# Patient Record
Sex: FEMALE | Race: WHITE | NOT HISPANIC OR LATINO | Employment: FULL TIME | URBAN - METROPOLITAN AREA
[De-identification: names, ages, dates, MRNs, and addresses within clinical notes are randomized per-mention and may not be internally consistent; named-entity substitution may affect disease eponyms.]

---

## 2024-06-19 RX ORDER — PHENTERMINE AND TOPIRAMATE 7.5; 46 MG/1; MG/1
CAPSULE, EXTENDED RELEASE ORAL DAILY
COMMUNITY
Start: 2024-05-24

## 2024-07-24 ENCOUNTER — APPOINTMENT (OUTPATIENT)
Dept: GERIATRIC MEDICINE | Facility: CLINIC | Age: 44
End: 2024-07-24

## 2024-07-24 ENCOUNTER — OFFICE VISIT (OUTPATIENT)
Dept: NEUROLOGY | Facility: CLINIC | Age: 44
End: 2024-07-24

## 2024-07-24 ENCOUNTER — LAB (OUTPATIENT)
Dept: LAB | Facility: LAB | Age: 44
End: 2024-07-24

## 2024-07-24 ENCOUNTER — APPOINTMENT (OUTPATIENT)
Dept: GENETICS | Facility: CLINIC | Age: 44
End: 2024-07-24

## 2024-07-24 VITALS
HEIGHT: 62 IN | SYSTOLIC BLOOD PRESSURE: 109 MMHG | WEIGHT: 188 LBS | TEMPERATURE: 98.1 F | HEART RATE: 74 BPM | BODY MASS INDEX: 34.6 KG/M2 | RESPIRATION RATE: 18 BRPM | DIASTOLIC BLOOD PRESSURE: 70 MMHG

## 2024-07-24 VITALS — TEMPERATURE: 98.4 F | HEART RATE: 84 BPM | SYSTOLIC BLOOD PRESSURE: 118 MMHG | DIASTOLIC BLOOD PRESSURE: 78 MMHG

## 2024-07-24 DIAGNOSIS — Z82.0 FAMILY HISTORY OF NEUROLOGICAL DISEASE: Primary | ICD-10-CM

## 2024-07-24 DIAGNOSIS — F41.8 DEPRESSION WITH ANXIETY: Primary | ICD-10-CM

## 2024-07-24 PROCEDURE — 99404 PREV MED CNSL INDIV APPRX 60: CPT | Performed by: MEDICAL GENETICS

## 2024-07-24 PROCEDURE — 99214 OFFICE O/P EST MOD 30 MIN: CPT | Performed by: PSYCHIATRY & NEUROLOGY

## 2024-07-24 PROCEDURE — 3008F BODY MASS INDEX DOCD: CPT | Performed by: MEDICAL GENETICS

## 2024-07-24 PROCEDURE — 1036F TOBACCO NON-USER: CPT | Performed by: PSYCHIATRY & NEUROLOGY

## 2024-07-24 RX ORDER — HYDROXYZINE HYDROCHLORIDE 25 MG/1
50 TABLET, FILM COATED ORAL 3 TIMES DAILY PRN
COMMUNITY
Start: 2024-01-24

## 2024-07-24 RX ORDER — ALUMINUM CHLORIDE 20 %
SOLUTION, NON-ORAL TOPICAL
COMMUNITY
Start: 2023-12-01

## 2024-07-24 RX ORDER — FLUOXETINE 10 MG/1
10 CAPSULE ORAL
COMMUNITY
Start: 2023-12-06

## 2024-07-24 ASSESSMENT — MONTREAL COGNITIVE ASSESSMENT (MOCA)
12. MEMORY INDEX SCORE: 3
WHAT LEVEL OF EDUCATION WAS ATTAINED: 1
10. [FLUENCY] NAME WORDS STARTING WITH DESIGNATED LETTER: 1
13. ORIENTATION SUBSCORE: 6
8. SERIAL SUBTRACTION OF 7S: 3
7. [VIGILENCE] TAP WHEN HEARING DESIGNATED LETTER: 1
9. REPEAT EACH SENTENCE: 2
WHAT IS THE TOTAL SCORE (OUT OF 30): 27
11. FOR EACH PAIR OF WORDS, WHAT CATEGORY DO THEY BELONG TO (OUT OF 2): 2
4. NAME EACH OF THE THREE ANIMALS SHOWN: 3
5. MEMORY TRIALS: 0
6. READ LIST OF DIGITS [FORWARD/BACKWARD]: 2
VISUOSPATIAL/EXECUTIVE SUBSCORE: 3

## 2024-07-24 ASSESSMENT — PATIENT HEALTH QUESTIONNAIRE - PHQ9
1. LITTLE INTEREST OR PLEASURE IN DOING THINGS: NOT AT ALL
2. FEELING DOWN, DEPRESSED OR HOPELESS: NOT AT ALL
SUM OF ALL RESPONSES TO PHQ9 QUESTIONS 1 & 2: 0

## 2024-07-24 ASSESSMENT — PAIN SCALES - GENERAL: PAINLEVEL: 0-NO PAIN

## 2024-07-24 ASSESSMENT — ENCOUNTER SYMPTOMS
OCCASIONAL FEELINGS OF UNSTEADINESS: 0
LOSS OF SENSATION IN FEET: 0
OCCASIONAL FEELINGS OF UNSTEADINESS: 0
DEPRESSION: 0
DEPRESSION: 0

## 2024-07-24 NOTE — LETTER
July 25, 2024     [facility]    Patient: Jena Louis   YOB: 1980   Date of Visit: 7/24/2024       Dear Dr. LARES - to file:    Thank you for referring Jena Louis to me for evaluation. Below are my notes for this consultation.  If you have questions, please do not hesitate to call me. I look forward to following your patient along with you.       Sincerely,     Roger Ortiz MD      CC: No Recipients  ______________________________________________________________________________________    Start Time: 3:55pm  Stop Time: 5:15pm    Formulation: Ms. Louis is a very-pleasant 43 y.o. year old, right handed, ,  female with 12 years of formal education, a family history of genetic Creutzfeldt-Yonis disease (E200K), and past personal history of depression and anxiety who is presenting today for a cognitive evaluation.  She still struggles with depression and anxiety and may benefit from seeing a psychiatrist to address possible medication changes as well as seeing a therapist.  She may also benefit from having a polysomnogram to evaluate for possible obstructive sleep apnea.  We discussed pros, cons, and alternatives of testing.    Diagnosis:  Depression with anxiety, Not otherwise specified, mild severity  Insomnia, Not otherwise specified (rule-out obstructive sleep apnea)    Plan:  - consider life insurance and long-term care insurance  - discuss and plan what you will and won't tell dad and your sister  - consider following-up with a psychiatrist and a therapist  - discuss insomnia and possible polysomnogram with PCP  - continue current medications  - I will wait for your call to potentially discuss results    --------------------------------------------------  History of Present Illness:    Referred by: self    Accompanied by:     Insight into changes: No    Onset and progression:  She denies any cognitive, functional, motor, or emotional changes.    Cognitive  symptoms:  Memory: No  Executive functioning: No  Language: No  Attention/Concentration: No  Social Cognition:    Personality changes: No   Socially inappropriate behaviors: No   Apathy: No   Loss of empathy: No   Change in eating habits: No   Perseverative behaviors: No    Functional changes:  IADLS: No  ADLS: No    Motor changes: No    Neuropsychiatric symptoms:    Mood:  variable   Sleep: problems staying asleep, light snoring noted, short latency to sleep, sometimes in dangerous settings (e.g., driving, back of motorcycle)   REM sleep behavior disorder symptoms: No   Anhedonia: No   Self-attitude: diminished   Appetite: good   PDW/SI: No   Hallucinations: No   Delusions: No    Prior Work-up: N/A    Past Neuropsychiatric History:  Handedness: right handed  History of traumatic brain injury: No  History of seizures: No  History of stroke: No  Past psychiatric history: Yes  Past diagnoses: 19 y.o.a. for depression/anxiety  Past psychiatric admissions: Yes, admitted for a couple of weeks at age 19 for suicidal ideation   Past psychiatric treatment: Yes, lexapro, lorazepam, fluoxetine, celexa  Psychiatrist/Therapist: none currently    Family History   Problem Relation Name Age of Onset   • Other (Genetic Creutzfeldt-Yonis disease (E200K)) Paternal Grandfather     • Other (Genetic Creutzfeldt-Yonis Disease (E200K)) Other Paternal family members        Social History     Tobacco Use   • Smoking status: Never   • Smokeless tobacco: Never   Substance Use Topics   • Alcohol use: Yes     Comment: twice per year     [employer]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     Past Medical History:   Diagnosis Date   • Obesity (BMI 30.0-34.9)    • Osteoarthritis        Past Surgical History:   Procedure Laterality Date   • TOTAL ABDOMINAL HYSTERECTOMY  2013       Allergies   Allergen Reactions   • Augmentin [Amoxicillin-Pot Clavulanate] Unknown         Current Outpatient Medications:   •  aluminum chloride (Drysol Dab-O-Matic) 20 % external  "solution, Apply topically once daily., Disp: , Rfl:   •  FLUoxetine (PROzac) 10 mg capsule, Take 1 capsule (10 mg) by mouth once daily., Disp: , Rfl:   •  hydrOXYzine HCL (Atarax) 25 mg tablet, Take 2 tablets (50 mg) by mouth 3 times a day as needed., Disp: , Rfl:   •  Qsymia 7.5-46 mg capsule, ER multiphase 24 hr, Take by mouth once daily., Disp: , Rfl:     Review of Systems  As per HPI, otherwise all other systems have been reviewed are negative for complaint.      Objective  /78   Pulse 84   Temp 36.9 °C (98.4 °F) (Tympanic)     EXAMINATION  Mental Status Examination  General appearance: well kept, good eye contact, cooperative  Orientation: alert and oriented to time, place, and person  Motor: unremarkable, gait is stable  Speech: normal rate, tone and rhythm  Mood: anxious  Affect: Euthymic, full-range and Congruent with mood and topic of conversation  Passive death wish: No  Suicidal ideation: No  Thought process: logical, linear, and goal-directed  Thought content: Hallucinations:no, Delusions: none, denied; and not responding to internal stimuli  Insight/Judgment: Good/Good  Fund of knowledge: Good  Recent and remote memory: Good/Good  Attention span and concentration: Good  Language: regular, rate, rhythm, tone, and volume and without paraphrasic errors    MoCA (7/24/2024) : 27/30 (missing -2 visuospatial/executive, -2 delayed, +1 edu )  \"f\"= [13], \"animals\"= [15]  Memory Index Score (MIS): 13/15  Agraphia: No  Alexia: No  Head turning sign: negative    NEUROLOGICAL EXAMINATION  CN II-XII grossly intact    Motor:  Muscle bulk was normal  normal throughout upper and lower extremities  Finger taps and hand opening: normal  Toe and heel taps: normal  Muscular tone: normal  Movements: normal    Reflexes:  2+ bilaterally           Sandro's reflex: abnormal on right side, negative Babinski's sign  Frontal release signs: none    Coordination: Finger-nose-finger testing is normal and without tremor or " dysmetria. and Heel-knee-shin testing is normal.    Gait:  Able to stand from seated position with arms across chest: normal  stable    Sensory  Romberg's sign: negative

## 2024-07-24 NOTE — PROGRESS NOTES
"HITECH encounter    Jena Louis is a 43-year-old female here today for genetic counseling for predictive genetic testing, in the setting of a family history of familial Creutzfeldt-Yonis disease (fCJD) in a grandparent.  She was accompanied by her  today.  She was referred by and will see Dr. Roger Ortiz later today for further evaluation.    PMHx:  Hysterectomy in 2013 for excessive breakthrough bleeding, but still has ovaries.  Some depression and anxiety, obesity, minor arthritis.    Family History:      Paternal grandfather had CJD and passed away at age 44.       Two of father's sisters had CJD or tested positive for the mutation.   A paternal aunt passed away in her early 70s after <1 year of symptoms.   Another paternal aunt, age 65, tested positive, but has had a very slow progression of neurological symptoms; it is uncertain whether these actually represent CJD at this time or whether she could even be presymptomatic.    A paternal cousin, early 50s, tested positive and is asymptomatic.    Other relatives of grandfather, including those in the preceding generation, are suspected to have had CJD.    Mrs. Louis's father has not been tested. He was in an MVA around age 36 and experienced TBI and other serious injuries, now age 70.  He has had some \"mental decline\" in the past approximately 2 years but no symptoms specific for CJD.  He also had an MI in 2013 and a stroke.    Family history is also notable for mother,  at age 44, who had a history of both ovarian/cervical/uterine cancer with an unknown primary, and pancreatic cancer.  Paternal grandmother also  of pancreatic cancer in her mid 80s.  Maternal grandmother was  at age 51 of cancer that may have been breast cancer.    Mrs. Louis states she has known about this genetic condition being in her family \"my whole life\" and has not donated blood due to this concern.  Her adult children are now starting families.   She notes that " she feels like it is time to know her status for the sake of her children.      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  , 2 adult sons, one grandchild on the way.  Lives in [place].      Discussion:     It was a pleasure to meet you today.  We discussed familial Creutzfeldt-Yonis disease, or fCJD.  (Genetic forms are responsible for about 10-15% of all CJD.)    The symptoms of cognitive decline, or dementia, followed by unsteady gait and incoordination, or ataxia, are characteristic of individuals with fCJD.  Additional features that may be seen in some individuals or families with fCJD include myoclonus, or muscle twitching, and less commonly, other manifestations such as psychiatric disorders, seizures, spasticity, Parkinson disease-like features (including tremor), sleep disturbance, limitations in eye movement (supranuclear gaze palsy), and peripheral neuropathy (nerve damage).  As you are aware, the disease often has a fairly rapid course (measured in months) from diagnosis to a person's death.    Although not discussed in detail, the PRNP gene contains the instructions for the assembly of the prion protein.  The prion protein, which is produced in the brain, has an unclear role in the human body.  Normally the prion protein has a particular shape, specifically an alpha helix.  This normal prion protein can be degraded by enzymes called proteases.  Individuals with a genetic change in the PRNP gene produce abnormal prion protein.  This abnormal prion protein is relatively resistant to being degraded by proteases.  It is believed that in patients with a pathologic change in the PRNP gene, the abnormal prion protein, PrPSc, accumulates in cells in the brain and is toxic to them.    The genetic change called E200K that you report in your family members (per Dr. Ortiz's conversation with you) results from a single nucleotide change,  "from a “G” to an “A” in the genetic code, specifically at the location of nucleotide #598 (c.598G>A).  This causes the product of the gene to contain a different amino acid at position #200, specifically lysine (K) instead of glutamic acid (E).      Your test report will tell us whether or not you inherited this, and will also include information about the \"#129\" amino acid position of the gene.  For each copy of the gene, people either have an M or a V at that position.  “129M” describes the presence of methionine rather than valine at amino acid #129 in the protein.  129M is not  disease-causing in itself, but is a variant which can in some cases modify the effects of having an E200K or other genetic change in the PRNP gene.  There is limited information about the impact of 129M on individuals with the E200K genetic change, but some sources suggest that there may be an earlier onset and quicker progression of disease if 129M is found in both of an individual's two copies of the gene.   (Some newer sources, that I accessed after the visit, suggest the impact of 129M may be minimal in persons with E200K.  See Parul WEINER et al. Age at onset in genetic prion disease and the design of preventive clinical trials. Neurology. 2019 Jul 9;93(2):l949-e589. Epub 2019 Jun 6. PMID: 57332298; PMCID: [id number].)    Familial CJD is inherited in an autosomal dominant manner.  This means that individuals with fCJD have a genetic change in one of their two copies of the PRNP gene, and that the children of individuals with this genetic change have a 50% chance of inheriting the change.    There are many genetic changes that can be associated with fCJD, of which the E200K is one of the most common.  The age of onset in patients who have fCJD resulting from an E200K genetic change varies widely, with a mean age of about 60 years reported in the literature.  However, the age of onset of [id number] fCJD may vary widely, with " "individuals reported with symptoms between ages 31-92.  Individuals with the genetic change are increasingly likely to develop symptoms as they age.    The E200K genetic change is also notable for having incomplete penetrance. That means that some individuals who have the genetic change will not develop fCJD, although most will develop the condition if they live to the expected age of onset.  Specific estimates of the percentage of individuals with the genetic change who will develop symptoms of fCJD vary widely in the medical literature. A recent estimate is >90%.    It is important to recognize that a phenomenon called \"ascertainment bias\" sometimes makes estimates of penetrance (i.e., the chance that a person with a mutation will develop symptoms) appear higher than they actually are. Individuals who do not develop symptoms are less likely to have genetic testing, so may have an unrecognized mutation, and not be counted in the statistics of how many people with the mutation did and did not develop symptoms.    See: Winter ABDUL, Aguila SAINZ. Genetic counseling for prion disease: Updates and best practices. [facility]. 2022 Oct;24(10):4972-9663. doi: 10.1016/j.gim.2022.06.003. Epub 2022 Jul 12. PMID: 94005539.    We discussed TANK, which stands for the Genetic Information Nondiscrimination Act and is legislation that prevents health insurance companies from using an individual's genetic information to determine eligibility or establish premiums. It also provides protection against employment discrimination based on genetic information with the exception of the [place] , the Federal Employees Health Benefits program, and employers with fewer than 15 employees. However, this protection does not apply to life insurance, disability insurance, or long-term care insurance, meaning these companies can ask for genetic information when an individual takes out a new/additional policy in one of those areas. As such, for " symptom-free individuals it could be beneficial to explore/take out policies in these areas PRIOR to undergoing genetic testing.   We discussed that individuals are obligated to report what they know if asked directly regardless of whether medical records are being shared.    Overall, I consider you at 25% risk of inheriting familial CJD, because you had an affected grandfather and paternal aunts, but your father's status is unknown.  His traumatic brain injury makes it more difficult to interpret why he is having cognitive decline.  Some individuals with familial CJD do not develop symptoms until their 80s or 90s, so his gene status cannot be determined by age alone.    If you were to test positive, you understand that this would mean that your father almost certainly carries the gene change.  (You are not aware of any CJD on your maternal side of the family.)  You are not planning to share your results with him.  If you test negative, it would not really tell us anything about your father's gene status.  We also discussed that if your result is positive, your sister and each of your sons would have a 50% chance of sharing the gene change.  When a parent has the gene change, each child has a 50/50 chance of inheriting it, and inheritance of a working or non-working copy of the gene by each child is an independent event.    It is also possible for a person of child-bearing age with positive gene status (in either the male or female partner) to use reproductive technology, called preimplantation genetic diagnosis, to start a pregnancy using an embryo that is known not to have the gene change.  (This is sometimes possible for at-risk parents to do without actually knowing their own gene status, but this is much more complicated.  If one of your sons is interested in this, they and their partner should discuss this with a genetic counselor.)    I remain hopeful that at some point in the future, researchers will develop  effective prevention and/or treatment of familial CJD, perhaps in the form of gene therapy to modify the non-working gene.      Plan:    1) At the conclusion of the visit, you elected to be tested and wish to consider making some additional planning arrangements prior to learning your results.  We syed your blood today (4 tubes) and are sending it to the [facility] for this testing. Testing is expected to take approximately 1 month. Dr. Ortiz will return your results to you, and you will discuss the logistics and timing of that with him.   Results will likely not be released directly into [employer].  However, if you do get a [employer] notification, I recommend that you call Dr. Ortiz before looking at the result.  The test will look at the entire gene, although you are unlikely to have a different gene change from the one that relatives have.  (If for some reason your relatives had a mutation other than E200K, this would still detect it if present in you.)     2) I would encourage you to consider seeing a cancer genetic counselor in [place] (these services are widely available), to discuss your family history of cancer.  It is possible that identification of a cancer risk gene could impact your own health care decision making, and also could be informative for your children and future grandchildren.    If you have any questions, please call Maru Jimenez in the [facility] at [phone] option 1 or at her direct number [phone] or send me a non-urgent message through [employer].    Cathy Barrera MD  Clinical      Visit time: 12:37-1:47

## 2024-07-24 NOTE — PATIENT INSTRUCTIONS
- consider life insurance and long-term care insurance  - discuss and plan what you will and won't tell dad and your sister  - consider following-up with a psychiatrist and a therapist  - continue current medications  - I will wait for your call to potentially discuss results    General brain health guidelines:  - make sure your other medical conditions are well controlled (e.g., high blood pressure, high cholesterol, diabetes, sleep apnea etc)  - do not smoke or chew tobacco  - address any sensory deficits (e.g., proper glasses for poor eyesight, hearing aids for hearing loss)  - use a weekly pill box  - eat a heart healthy diet (e.g., lots of fruits and vegetables, low fat and cholesterol)  - exercise at least four days per week, 30 minutes at a time at an intensity that would make it difficult to converse with someone   - make sure you are getting at least 7 hours of quality sleep per night  - keep yourself mentally active daily by reading, playing cards, doing word searches, puzzles, etc.  - stay socially active by being part of a group or organization    Please note that the above may not be an entirely accurate or complete list of your medications, allergies, past medical history, past surgical history, or active problems as the document is completed following your visit.

## 2024-07-24 NOTE — PROGRESS NOTES
Start Time: 3:55pm  Stop Time: 5:15pm    Formulation: Ms. Louis is a very-pleasant 43 y.o. year old, right handed, ,  female with 12 years of formal education, a family history of genetic Creutzfeldt-Yonis disease (E200K), and past personal history of depression and anxiety who is presenting today for a cognitive evaluation.  She still struggles with depression and anxiety and may benefit from seeing a psychiatrist to address possible medication changes as well as seeing a therapist.  She may also benefit from having a polysomnogram to evaluate for possible obstructive sleep apnea.  We discussed pros, cons, and alternatives of testing.    Diagnosis:  Depression with anxiety, Not otherwise specified, mild severity  Insomnia, Not otherwise specified (rule-out obstructive sleep apnea)    Plan:  - consider life insurance and long-term care insurance  - discuss and plan what you will and won't tell dad and your sister  - consider following-up with a psychiatrist and a therapist  - discuss insomnia and possible polysomnogram with PCP  - continue current medications  - I will wait for your call to potentially discuss results    --------------------------------------------------  History of Present Illness:    Referred by: self    Accompanied by:     Insight into changes: No    Onset and progression:  She denies any cognitive, functional, motor, or emotional changes.    Cognitive symptoms:  Memory: No  Executive functioning: No  Language: No  Attention/Concentration: No  Social Cognition:    Personality changes: No   Socially inappropriate behaviors: No   Apathy: No   Loss of empathy: No   Change in eating habits: No   Perseverative behaviors: No    Functional changes:  IADLS: No  ADLS: No    Motor changes: No    Neuropsychiatric symptoms:    Mood:  variable   Sleep: problems staying asleep, light snoring noted, short latency to sleep, sometimes in dangerous settings (e.g., driving, back of  motorcycle)   REM sleep behavior disorder symptoms: No   Anhedonia: No   Self-attitude: diminished   Appetite: good   PDW/SI: No   Hallucinations: No   Delusions: No    Prior Work-up: N/A    Past Neuropsychiatric History:  Handedness: right handed  History of traumatic brain injury: No  History of seizures: No  History of stroke: No  Past psychiatric history: Yes  Past diagnoses: 19 y.o.a. for depression/anxiety  Past psychiatric admissions: Yes, admitted for a couple of weeks at age 19 for suicidal ideation   Past psychiatric treatment: Yes, lexapro, lorazepam, fluoxetine, celexa  Psychiatrist/Therapist: none currently    Family History   Problem Relation Name Age of Onset    Other (Genetic Creutzfeldt-Yonis disease (E200K)) Paternal Grandfather      Other (Genetic Creutzfeldt-Yonis Disease (E200K)) Other Paternal family members        Social History     Tobacco Use    Smoking status: Never    Smokeless tobacco: Never   Substance Use Topics    Alcohol use: Yes     Comment: twice per year     [facility]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     Past Medical History:   Diagnosis Date    Obesity (BMI 30.0-34.9)     Osteoarthritis        Past Surgical History:   Procedure Laterality Date    TOTAL ABDOMINAL HYSTERECTOMY  2013       Allergies   Allergen Reactions    Augmentin [Amoxicillin-Pot Clavulanate] Unknown         Current Outpatient Medications:     aluminum chloride (Drysol Dab-O-Matic) 20 % external solution, Apply topically once daily., Disp: , Rfl:     FLUoxetine (PROzac) 10 mg capsule, Take 1 capsule (10 mg) by mouth once daily., Disp: , Rfl:     hydrOXYzine HCL (Atarax) 25 mg tablet, Take 2 tablets (50 mg) by mouth 3 times a day as needed., Disp: , Rfl:     Qsymia 7.5-46 mg capsule, ER multiphase 24 hr, Take by mouth once daily., Disp: , Rfl:     Review of Systems  As per HPI, otherwise all other systems have been reviewed are negative for complaint.      Objective   /78   Pulse 84   Temp 36.9 °C (98.4 °F)  "(Tympanic)     EXAMINATION  Mental Status Examination  General appearance: well kept, good eye contact, cooperative  Orientation: alert and oriented to time, place, and person  Motor: unremarkable, gait is stable  Speech: normal rate, tone and rhythm  Mood: anxious  Affect: Euthymic, full-range and Congruent with mood and topic of conversation  Passive death wish: No  Suicidal ideation: No  Thought process: logical, linear, and goal-directed  Thought content: Hallucinations:no, Delusions: none, denied; and not responding to internal stimuli  Insight/Judgment: Good/Good  Fund of knowledge: Good  Recent and remote memory: Good/Good  Attention span and concentration: Good  Language: regular, rate, rhythm, tone, and volume and without paraphrasic errors    MoCA (7/24/2024) : 27/30 (missing -2 visuospatial/executive, -2 delayed, +1 edu )  \"f\"= [13], \"animals\"= [15]  Memory Index Score (MIS): 13/15  Agraphia: No  Alexia: No  Head turning sign: negative    NEUROLOGICAL EXAMINATION  CN II-XII grossly intact    Motor:  Muscle bulk was normal  normal throughout upper and lower extremities  Finger taps and hand opening: normal  Toe and heel taps: normal  Muscular tone: normal  Movements: normal    Reflexes:  2+ bilaterally           Sandro's reflex: abnormal on right side, negative Babinski's sign  Frontal release signs: none    Coordination: Finger-nose-finger testing is normal and without tremor or dysmetria. and Heel-knee-shin testing is normal.    Gait:  Able to stand from seated position with arms across chest: normal  stable    Sensory  Romberg's sign: negative   "

## 2024-11-18 ENCOUNTER — TELEPHONE (OUTPATIENT)
Dept: GERIATRIC MEDICINE | Facility: CLINIC | Age: 44
End: 2024-11-18

## 2024-11-19 ENCOUNTER — DOCUMENTATION (OUTPATIENT)
Dept: NEUROLOGY | Facility: CLINIC | Age: 44
End: 2024-11-19

## 2024-11-19 NOTE — TELEPHONE ENCOUNTER
Patient states she is ready to receive the results of her prion testing she had done 7/24/24.  Message sent to Dr. Ortiz. Phone appointment scheduled for 11/19.